# Patient Record
Sex: MALE | Race: OTHER | NOT HISPANIC OR LATINO | URBAN - METROPOLITAN AREA
[De-identification: names, ages, dates, MRNs, and addresses within clinical notes are randomized per-mention and may not be internally consistent; named-entity substitution may affect disease eponyms.]

---

## 2019-12-02 ENCOUNTER — INPATIENT (INPATIENT)
Facility: HOSPITAL | Age: 44
LOS: 1 days | Discharge: LEFT BEFORE DISCHARGE | DRG: 329 | End: 2019-12-04
Attending: SURGERY | Admitting: SURGERY
Payer: MEDICAID

## 2019-12-02 VITALS
HEART RATE: 136 BPM | WEIGHT: 229.94 LBS | DIASTOLIC BLOOD PRESSURE: 107 MMHG | TEMPERATURE: 98 F | SYSTOLIC BLOOD PRESSURE: 153 MMHG | OXYGEN SATURATION: 95 % | RESPIRATION RATE: 18 BRPM | HEIGHT: 67 IN

## 2019-12-02 DIAGNOSIS — K27.5 CHRONIC OR UNSPECIFIED PEPTIC ULCER, SITE UNSPECIFIED, WITH PERFORATION: ICD-10-CM

## 2019-12-02 LAB
ALBUMIN SERPL ELPH-MCNC: 3.6 G/DL — SIGNIFICANT CHANGE UP (ref 3.3–5)
ALBUMIN SERPL ELPH-MCNC: 4.7 G/DL — SIGNIFICANT CHANGE UP (ref 3.3–5)
ALP SERPL-CCNC: 66 U/L — SIGNIFICANT CHANGE UP (ref 40–120)
ALP SERPL-CCNC: 88 U/L — SIGNIFICANT CHANGE UP (ref 40–120)
ALT FLD-CCNC: 23 U/L — SIGNIFICANT CHANGE UP (ref 10–45)
ALT FLD-CCNC: 25 U/L — SIGNIFICANT CHANGE UP (ref 10–45)
ANION GAP SERPL CALC-SCNC: 11 MMOL/L — SIGNIFICANT CHANGE UP (ref 5–17)
ANION GAP SERPL CALC-SCNC: 18 MMOL/L — HIGH (ref 5–17)
APTT BLD: 26.9 SEC — LOW (ref 27.5–36.3)
AST SERPL-CCNC: 12 U/L — SIGNIFICANT CHANGE UP (ref 10–40)
AST SERPL-CCNC: 12 U/L — SIGNIFICANT CHANGE UP (ref 10–40)
BASOPHILS # BLD AUTO: 0.03 K/UL — SIGNIFICANT CHANGE UP (ref 0–0.2)
BASOPHILS NFR BLD AUTO: 0.1 % — SIGNIFICANT CHANGE UP (ref 0–2)
BILIRUB DIRECT SERPL-MCNC: 0.4 MG/DL — HIGH (ref 0–0.2)
BILIRUB INDIRECT FLD-MCNC: 0.5 MG/DL — SIGNIFICANT CHANGE UP (ref 0.2–1)
BILIRUB SERPL-MCNC: 0.7 MG/DL — SIGNIFICANT CHANGE UP (ref 0.2–1.2)
BILIRUB SERPL-MCNC: 0.9 MG/DL — SIGNIFICANT CHANGE UP (ref 0.2–1.2)
BLD GP AB SCN SERPL QL: NEGATIVE — SIGNIFICANT CHANGE UP
BUN SERPL-MCNC: 21 MG/DL — SIGNIFICANT CHANGE UP (ref 7–23)
BUN SERPL-MCNC: 22 MG/DL — SIGNIFICANT CHANGE UP (ref 7–23)
CALCIUM SERPL-MCNC: 7.9 MG/DL — LOW (ref 8.4–10.5)
CALCIUM SERPL-MCNC: 9.7 MG/DL — SIGNIFICANT CHANGE UP (ref 8.4–10.5)
CHLORIDE SERPL-SCNC: 104 MMOL/L — SIGNIFICANT CHANGE UP (ref 96–108)
CHLORIDE SERPL-SCNC: 99 MMOL/L — SIGNIFICANT CHANGE UP (ref 96–108)
CO2 SERPL-SCNC: 25 MMOL/L — SIGNIFICANT CHANGE UP (ref 22–31)
CO2 SERPL-SCNC: 26 MMOL/L — SIGNIFICANT CHANGE UP (ref 22–31)
CREAT SERPL-MCNC: 0.99 MG/DL — SIGNIFICANT CHANGE UP (ref 0.5–1.3)
CREAT SERPL-MCNC: 0.99 MG/DL — SIGNIFICANT CHANGE UP (ref 0.5–1.3)
EOSINOPHIL # BLD AUTO: 0.02 K/UL — SIGNIFICANT CHANGE UP (ref 0–0.5)
EOSINOPHIL NFR BLD AUTO: 0.1 % — SIGNIFICANT CHANGE UP (ref 0–6)
GAS PNL BLDV: SIGNIFICANT CHANGE UP
GLUCOSE SERPL-MCNC: 117 MG/DL — HIGH (ref 70–99)
GLUCOSE SERPL-MCNC: 119 MG/DL — HIGH (ref 70–99)
HCT VFR BLD CALC: 43.6 % — SIGNIFICANT CHANGE UP (ref 39–50)
HCT VFR BLD CALC: 49.9 % — SIGNIFICANT CHANGE UP (ref 39–50)
HGB BLD-MCNC: 14.3 G/DL — SIGNIFICANT CHANGE UP (ref 13–17)
HGB BLD-MCNC: 17 G/DL — SIGNIFICANT CHANGE UP (ref 13–17)
IMM GRANULOCYTES NFR BLD AUTO: 0.5 % — SIGNIFICANT CHANGE UP (ref 0–1.5)
INR BLD: 1.01 RATIO — SIGNIFICANT CHANGE UP (ref 0.88–1.16)
LIDOCAIN IGE QN: 20 U/L — SIGNIFICANT CHANGE UP (ref 7–60)
LYMPHOCYTES # BLD AUTO: 13.7 % — SIGNIFICANT CHANGE UP (ref 13–44)
LYMPHOCYTES # BLD AUTO: 2.76 K/UL — SIGNIFICANT CHANGE UP (ref 1–3.3)
MAGNESIUM SERPL-MCNC: 1.9 MG/DL — SIGNIFICANT CHANGE UP (ref 1.6–2.6)
MCHC RBC-ENTMCNC: 28.4 PG — SIGNIFICANT CHANGE UP (ref 27–34)
MCHC RBC-ENTMCNC: 28.8 PG — SIGNIFICANT CHANGE UP (ref 27–34)
MCHC RBC-ENTMCNC: 32.8 GM/DL — SIGNIFICANT CHANGE UP (ref 32–36)
MCHC RBC-ENTMCNC: 34.1 GM/DL — SIGNIFICANT CHANGE UP (ref 32–36)
MCV RBC AUTO: 84.4 FL — SIGNIFICANT CHANGE UP (ref 80–100)
MCV RBC AUTO: 86.7 FL — SIGNIFICANT CHANGE UP (ref 80–100)
MONOCYTES # BLD AUTO: 1.04 K/UL — HIGH (ref 0–0.9)
MONOCYTES NFR BLD AUTO: 5.2 % — SIGNIFICANT CHANGE UP (ref 2–14)
NEUTROPHILS # BLD AUTO: 16.16 K/UL — HIGH (ref 1.8–7.4)
NEUTROPHILS NFR BLD AUTO: 80.4 % — HIGH (ref 43–77)
NRBC # BLD: 0 /100 WBCS — SIGNIFICANT CHANGE UP (ref 0–0)
NRBC # BLD: 0 /100 WBCS — SIGNIFICANT CHANGE UP (ref 0–0)
PHOSPHATE SERPL-MCNC: 5 MG/DL — HIGH (ref 2.5–4.5)
PLATELET # BLD AUTO: 245 K/UL — SIGNIFICANT CHANGE UP (ref 150–400)
PLATELET # BLD AUTO: 300 K/UL — SIGNIFICANT CHANGE UP (ref 150–400)
POTASSIUM SERPL-MCNC: 3.4 MMOL/L — LOW (ref 3.5–5.3)
POTASSIUM SERPL-MCNC: 4.2 MMOL/L — SIGNIFICANT CHANGE UP (ref 3.5–5.3)
POTASSIUM SERPL-SCNC: 3.4 MMOL/L — LOW (ref 3.5–5.3)
POTASSIUM SERPL-SCNC: 4.2 MMOL/L — SIGNIFICANT CHANGE UP (ref 3.5–5.3)
PROT SERPL-MCNC: 6.3 G/DL — SIGNIFICANT CHANGE UP (ref 6–8.3)
PROT SERPL-MCNC: 8.1 G/DL — SIGNIFICANT CHANGE UP (ref 6–8.3)
PROTHROM AB SERPL-ACNC: 11.5 SEC — SIGNIFICANT CHANGE UP (ref 10–12.9)
RBC # BLD: 5.03 M/UL — SIGNIFICANT CHANGE UP (ref 4.2–5.8)
RBC # BLD: 5.91 M/UL — HIGH (ref 4.2–5.8)
RBC # FLD: 11.7 % — SIGNIFICANT CHANGE UP (ref 10.3–14.5)
RBC # FLD: 12.1 % — SIGNIFICANT CHANGE UP (ref 10.3–14.5)
RH IG SCN BLD-IMP: POSITIVE — SIGNIFICANT CHANGE UP
RH IG SCN BLD-IMP: POSITIVE — SIGNIFICANT CHANGE UP
SODIUM SERPL-SCNC: 141 MMOL/L — SIGNIFICANT CHANGE UP (ref 135–145)
SODIUM SERPL-SCNC: 142 MMOL/L — SIGNIFICANT CHANGE UP (ref 135–145)
WBC # BLD: 20.11 K/UL — HIGH (ref 3.8–10.5)
WBC # BLD: 20.37 K/UL — HIGH (ref 3.8–10.5)
WBC # FLD AUTO: 20.11 K/UL — HIGH (ref 3.8–10.5)
WBC # FLD AUTO: 20.37 K/UL — HIGH (ref 3.8–10.5)

## 2019-12-02 PROCEDURE — 49905 OMENTAL FLAP INTRA-ABDOM: CPT

## 2019-12-02 PROCEDURE — 99222 1ST HOSP IP/OBS MODERATE 55: CPT | Mod: 57

## 2019-12-02 PROCEDURE — 74174 CTA ABD&PLVS W/CONTRAST: CPT | Mod: 26

## 2019-12-02 PROCEDURE — 93010 ELECTROCARDIOGRAM REPORT: CPT

## 2019-12-02 PROCEDURE — 43840 GSTRRPHY SUTR DUOL/GSTR ULCR: CPT

## 2019-12-02 PROCEDURE — 71275 CT ANGIOGRAPHY CHEST: CPT | Mod: 26

## 2019-12-02 PROCEDURE — 99291 CRITICAL CARE FIRST HOUR: CPT

## 2019-12-02 RX ORDER — PIPERACILLIN AND TAZOBACTAM 4; .5 G/20ML; G/20ML
3.38 INJECTION, POWDER, LYOPHILIZED, FOR SOLUTION INTRAVENOUS EVERY 8 HOURS
Refills: 0 | Status: DISCONTINUED | OUTPATIENT
Start: 2019-12-02 | End: 2019-12-02

## 2019-12-02 RX ORDER — HYDROMORPHONE HYDROCHLORIDE 2 MG/ML
0.5 INJECTION INTRAMUSCULAR; INTRAVENOUS; SUBCUTANEOUS
Refills: 0 | Status: DISCONTINUED | OUTPATIENT
Start: 2019-12-02 | End: 2019-12-04

## 2019-12-02 RX ORDER — PANTOPRAZOLE SODIUM 20 MG/1
8 TABLET, DELAYED RELEASE ORAL
Qty: 80 | Refills: 0 | Status: DISCONTINUED | OUTPATIENT
Start: 2019-12-02 | End: 2019-12-02

## 2019-12-02 RX ORDER — PANTOPRAZOLE SODIUM 20 MG/1
40 TABLET, DELAYED RELEASE ORAL ONCE
Refills: 0 | Status: COMPLETED | OUTPATIENT
Start: 2019-12-02 | End: 2019-12-02

## 2019-12-02 RX ORDER — ACETAMINOPHEN 500 MG
1000 TABLET ORAL EVERY 6 HOURS
Refills: 0 | Status: COMPLETED | OUTPATIENT
Start: 2019-12-02 | End: 2019-12-03

## 2019-12-02 RX ORDER — HYDROMORPHONE HYDROCHLORIDE 2 MG/ML
1 INJECTION INTRAMUSCULAR; INTRAVENOUS; SUBCUTANEOUS ONCE
Refills: 0 | Status: DISCONTINUED | OUTPATIENT
Start: 2019-12-02 | End: 2019-12-02

## 2019-12-02 RX ORDER — MORPHINE SULFATE 50 MG/1
4 CAPSULE, EXTENDED RELEASE ORAL ONCE
Refills: 0 | Status: DISCONTINUED | OUTPATIENT
Start: 2019-12-02 | End: 2019-12-02

## 2019-12-02 RX ORDER — PIPERACILLIN AND TAZOBACTAM 4; .5 G/20ML; G/20ML
3.38 INJECTION, POWDER, LYOPHILIZED, FOR SOLUTION INTRAVENOUS ONCE
Refills: 0 | Status: COMPLETED | OUTPATIENT
Start: 2019-12-02 | End: 2019-12-02

## 2019-12-02 RX ORDER — FENTANYL CITRATE 50 UG/ML
50 INJECTION INTRAVENOUS ONCE
Refills: 0 | Status: DISCONTINUED | OUTPATIENT
Start: 2019-12-02 | End: 2019-12-02

## 2019-12-02 RX ORDER — SODIUM CHLORIDE 9 MG/ML
1000 INJECTION, SOLUTION INTRAVENOUS ONCE
Refills: 0 | Status: COMPLETED | OUTPATIENT
Start: 2019-12-02 | End: 2019-12-02

## 2019-12-02 RX ORDER — NALOXONE HYDROCHLORIDE 4 MG/.1ML
0.1 SPRAY NASAL
Refills: 0 | Status: DISCONTINUED | OUTPATIENT
Start: 2019-12-02 | End: 2019-12-04

## 2019-12-02 RX ORDER — HEPARIN SODIUM 5000 [USP'U]/ML
5000 INJECTION INTRAVENOUS; SUBCUTANEOUS EVERY 12 HOURS
Refills: 0 | Status: DISCONTINUED | OUTPATIENT
Start: 2019-12-02 | End: 2019-12-03

## 2019-12-02 RX ORDER — PANTOPRAZOLE SODIUM 20 MG/1
40 TABLET, DELAYED RELEASE ORAL ONCE
Refills: 0 | Status: DISCONTINUED | OUTPATIENT
Start: 2019-12-02 | End: 2019-12-02

## 2019-12-02 RX ORDER — SODIUM CHLORIDE 9 MG/ML
1000 INJECTION INTRAMUSCULAR; INTRAVENOUS; SUBCUTANEOUS ONCE
Refills: 0 | Status: COMPLETED | OUTPATIENT
Start: 2019-12-02 | End: 2019-12-02

## 2019-12-02 RX ORDER — SODIUM CHLORIDE 9 MG/ML
1000 INJECTION, SOLUTION INTRAVENOUS
Refills: 0 | Status: DISCONTINUED | OUTPATIENT
Start: 2019-12-02 | End: 2019-12-03

## 2019-12-02 RX ORDER — PANTOPRAZOLE SODIUM 20 MG/1
40 TABLET, DELAYED RELEASE ORAL EVERY 12 HOURS
Refills: 0 | Status: DISCONTINUED | OUTPATIENT
Start: 2019-12-02 | End: 2019-12-04

## 2019-12-02 RX ORDER — FENTANYL CITRATE 50 UG/ML
100 INJECTION INTRAVENOUS ONCE
Refills: 0 | Status: DISCONTINUED | OUTPATIENT
Start: 2019-12-02 | End: 2019-12-02

## 2019-12-02 RX ORDER — HYDROMORPHONE HYDROCHLORIDE 2 MG/ML
0.5 INJECTION INTRAMUSCULAR; INTRAVENOUS; SUBCUTANEOUS ONCE
Refills: 0 | Status: DISCONTINUED | OUTPATIENT
Start: 2019-12-02 | End: 2019-12-02

## 2019-12-02 RX ORDER — PIPERACILLIN AND TAZOBACTAM 4; .5 G/20ML; G/20ML
3.38 INJECTION, POWDER, LYOPHILIZED, FOR SOLUTION INTRAVENOUS EVERY 8 HOURS
Refills: 0 | Status: DISCONTINUED | OUTPATIENT
Start: 2019-12-02 | End: 2019-12-04

## 2019-12-02 RX ORDER — ONDANSETRON 8 MG/1
4 TABLET, FILM COATED ORAL EVERY 6 HOURS
Refills: 0 | Status: DISCONTINUED | OUTPATIENT
Start: 2019-12-02 | End: 2019-12-04

## 2019-12-02 RX ORDER — SODIUM CHLORIDE 9 MG/ML
1000 INJECTION, SOLUTION INTRAVENOUS
Refills: 0 | Status: DISCONTINUED | OUTPATIENT
Start: 2019-12-02 | End: 2019-12-02

## 2019-12-02 RX ORDER — HYDROMORPHONE HYDROCHLORIDE 2 MG/ML
30 INJECTION INTRAMUSCULAR; INTRAVENOUS; SUBCUTANEOUS
Refills: 0 | Status: DISCONTINUED | OUTPATIENT
Start: 2019-12-02 | End: 2019-12-04

## 2019-12-02 RX ADMIN — SODIUM CHLORIDE 125 MILLILITER(S): 9 INJECTION, SOLUTION INTRAVENOUS at 15:49

## 2019-12-02 RX ADMIN — PANTOPRAZOLE SODIUM 40 MILLIGRAM(S): 20 TABLET, DELAYED RELEASE ORAL at 12:02

## 2019-12-02 RX ADMIN — Medication 1000 MILLIGRAM(S): at 17:45

## 2019-12-02 RX ADMIN — PIPERACILLIN AND TAZOBACTAM 200 GRAM(S): 4; .5 INJECTION, POWDER, LYOPHILIZED, FOR SOLUTION INTRAVENOUS at 07:27

## 2019-12-02 RX ADMIN — HYDROMORPHONE HYDROCHLORIDE 1 MILLIGRAM(S): 2 INJECTION INTRAMUSCULAR; INTRAVENOUS; SUBCUTANEOUS at 07:30

## 2019-12-02 RX ADMIN — FENTANYL CITRATE 100 MICROGRAM(S): 50 INJECTION INTRAVENOUS at 05:10

## 2019-12-02 RX ADMIN — PANTOPRAZOLE SODIUM 40 MILLIGRAM(S): 20 TABLET, DELAYED RELEASE ORAL at 07:27

## 2019-12-02 RX ADMIN — SODIUM CHLORIDE 1000 MILLILITER(S): 9 INJECTION, SOLUTION INTRAVENOUS at 06:55

## 2019-12-02 RX ADMIN — Medication 400 MILLIGRAM(S): at 16:43

## 2019-12-02 RX ADMIN — HYDROMORPHONE HYDROCHLORIDE 1 MILLIGRAM(S): 2 INJECTION INTRAMUSCULAR; INTRAVENOUS; SUBCUTANEOUS at 06:58

## 2019-12-02 RX ADMIN — PIPERACILLIN AND TAZOBACTAM 25 GRAM(S): 4; .5 INJECTION, POWDER, LYOPHILIZED, FOR SOLUTION INTRAVENOUS at 14:37

## 2019-12-02 RX ADMIN — HYDROMORPHONE HYDROCHLORIDE 30 MILLILITER(S): 2 INJECTION INTRAMUSCULAR; INTRAVENOUS; SUBCUTANEOUS at 17:25

## 2019-12-02 RX ADMIN — SODIUM CHLORIDE 2000 MILLILITER(S): 9 INJECTION, SOLUTION INTRAVENOUS at 06:02

## 2019-12-02 RX ADMIN — FENTANYL CITRATE 50 MICROGRAM(S): 50 INJECTION INTRAVENOUS at 06:55

## 2019-12-02 RX ADMIN — HYDROMORPHONE HYDROCHLORIDE 30 MILLILITER(S): 2 INJECTION INTRAMUSCULAR; INTRAVENOUS; SUBCUTANEOUS at 14:00

## 2019-12-02 RX ADMIN — HEPARIN SODIUM 5000 UNIT(S): 5000 INJECTION INTRAVENOUS; SUBCUTANEOUS at 17:47

## 2019-12-02 RX ADMIN — PIPERACILLIN AND TAZOBACTAM 25 GRAM(S): 4; .5 INJECTION, POWDER, LYOPHILIZED, FOR SOLUTION INTRAVENOUS at 22:26

## 2019-12-02 RX ADMIN — HYDROMORPHONE HYDROCHLORIDE 30 MILLILITER(S): 2 INJECTION INTRAMUSCULAR; INTRAVENOUS; SUBCUTANEOUS at 19:13

## 2019-12-02 RX ADMIN — HYDROMORPHONE HYDROCHLORIDE 1 MILLIGRAM(S): 2 INJECTION INTRAMUSCULAR; INTRAVENOUS; SUBCUTANEOUS at 06:55

## 2019-12-02 RX ADMIN — Medication 400 MILLIGRAM(S): at 22:59

## 2019-12-02 RX ADMIN — FENTANYL CITRATE 50 MICROGRAM(S): 50 INJECTION INTRAVENOUS at 07:30

## 2019-12-02 RX ADMIN — Medication 1000 MILLIGRAM(S): at 23:29

## 2019-12-02 RX ADMIN — HYDROMORPHONE HYDROCHLORIDE 1 MILLIGRAM(S): 2 INJECTION INTRAMUSCULAR; INTRAVENOUS; SUBCUTANEOUS at 05:37

## 2019-12-02 RX ADMIN — PANTOPRAZOLE SODIUM 40 MILLIGRAM(S): 20 TABLET, DELAYED RELEASE ORAL at 17:47

## 2019-12-02 RX ADMIN — SODIUM CHLORIDE 1000 MILLILITER(S): 9 INJECTION INTRAMUSCULAR; INTRAVENOUS; SUBCUTANEOUS at 05:38

## 2019-12-02 RX ADMIN — MORPHINE SULFATE 4 MILLIGRAM(S): 50 CAPSULE, EXTENDED RELEASE ORAL at 04:57

## 2019-12-02 RX ADMIN — HYDROMORPHONE HYDROCHLORIDE 30 MILLILITER(S): 2 INJECTION INTRAMUSCULAR; INTRAVENOUS; SUBCUTANEOUS at 16:46

## 2019-12-02 NOTE — H&P ADULT - ATTENDING COMMENTS
Patient seen and examined preoperatively   Signs / symptoms / imaging consistent with acute peptic ulcer perforation  Risks / benefits / alternatives to immediate OR for exploration and repair discussed with patient.  All questions answered, consent signed

## 2019-12-02 NOTE — H&P ADULT - NSHPSOCIALHISTORY_GEN_ALL_CORE
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

## 2019-12-02 NOTE — BRIEF OPERATIVE NOTE - OPERATION/FINDINGS
Patient was positioned in the supine position and a midline laparotomy incision was made.    Pre-pyloric ulcer was identified and repaired with an omental patch     Fascia was closed and skin was partially closed with staples and packed with .5 inch packing strips

## 2019-12-02 NOTE — ED ADULT NURSE NOTE - NS ED NURSE REPORT GIVEN TO FT
Pt received bed assignment. Pt aware. Report given to RNApril. VSS. Pt stable for transport. Chart given to charge desk. Will cont to monitor. Pt received bed assignment. Pt aware. Report given to OR Arpil CEDILLO. VSS. Pt stable for transport. Chart given to charge desk. Will cont to monitor.

## 2019-12-02 NOTE — H&P ADULT - ASSESSMENT
This is a 43 y/o male with no significant pmhx or surgical history who presents to the ED with c/o severe, abrupt epigastric pain that radiates to the R flank and back with imaging revealing acute perforated duodenal ulcer with pneumoperitoneum.    -Admit to Surgery, Dr. Harvey  -NPO  -Aggressive IVF resuscitation, currently receiving 3rd liter bolus  -IVF @ 150cc/hr  -IV Protonix, bolus then drip  -IV Zosyn  -For OR for emergent exploratory laparotomy  -Pt seen and examined with Dr. Harvey

## 2019-12-02 NOTE — H&P ADULT - NSHPLABSRESULTS_GEN_ALL_CORE
Vital Signs Last 24 Hrs  T(C): 36.6 (02 Dec 2019 04:39), Max: 36.6 (02 Dec 2019 04:39)  T(F): 97.9 (02 Dec 2019 04:39), Max: 97.9 (02 Dec 2019 04:39)  HR: 131 (02 Dec 2019 06:01) (131 - 140)  BP: 116/69 (02 Dec 2019 06:01) (115/112 - 158/102)  BP(mean): --  RR: 22 (02 Dec 2019 06:01) (18 - 24)  SpO2: 93% (02 Dec 2019 06:01) (93% - 95%) Vital Signs Last 24 Hrs  T(C): 36.6 (02 Dec 2019 04:39), Max: 36.6 (02 Dec 2019 04:39)  T(F): 97.9 (02 Dec 2019 04:39), Max: 97.9 (02 Dec 2019 04:39)  HR: 131 (02 Dec 2019 06:01) (131 - 140)  BP: 116/69 (02 Dec 2019 06:01) (115/112 - 158/102)  BP(mean): --  RR: 22 (02 Dec 2019 06:01) (18 - 24)  SpO2: 93% (02 Dec 2019 06:01) (93% - 95%)    12-02    142  |  99  |  22  ----------------------------<  119<H>  3.4<L>   |  25  |  0.99    Ca    9.7      02 Dec 2019 04:59    TPro  8.1  /  Alb  4.7  /  TBili  0.7  /  DBili  x   /  AST  12  /  ALT  25  /  AlkPhos  88  12-02                          17.0   20.11 )-----------( 300      ( 02 Dec 2019 04:59 )             49.9     < from: CT Angio Chest w/ IV Cont (12.02.19 @ 05:16) >    FINDINGS:    CHEST:     LUNGS AND LARGE AIRWAYS: Patent central airways. Bibasilar linear   atelectasis.  PLEURA: No pleural effusion.  VESSELS: No intramural hematoma, aortic dissection, or penetrating aortic   ulcer.  HEART: Left ventricular hypertrophy. No pericardial effusion.  MEDIASTINUM AND LORNA: No lymphadenopathy.  CHEST WALL AND LOWER NECK: Within normal limits.    ABDOMEN AND PELVIS:    LIVER: Multiple low-attenuation lesions in the right hepatic lobe. One of   the lesions demonstrates centripetal nodular enhancement, likely a   hemangioma.  BILEDUCTS: Normal caliber.  GALLBLADDER: Within normal limits.  SPLEEN: Within normal limits.  PANCREAS: Within normal limits.  ADRENALS: Within normal limits.  KIDNEYS/URETERS: Within normal limits.    BLADDER: Within normal limits.  REPRODUCTIVE ORGANS: Prostate within normal limits.    BOWEL: There is free intraperitoneal air with air and fluid centered   around the duodenal bulb region, which appears thickened and irregular   suggesting perforated ulcer. Scattered colonic diverticulosis without   diverticulitis. No bowel obstruction. Appendix is normal.  PERITONEUM: Pneumoperitoneum. Small volume pelvic ascites.  VESSELS: Within normal limits.  RETROPERITONEUM/LYMPH NODES: No lymphadenopathy.    ABDOMINAL WALL: Within normal limits.  BONES: Spinal degenerative changes.    IMPRESSION:     Free intraperitoneal air and ascites likely from perforated duodenal   ulcer.    No aortic dissection.

## 2019-12-02 NOTE — PROVIDER CONTACT NOTE (OTHER) - RECOMMENDATIONS
Bladder scan revealed  400 cc urine residual. and encouraged pt to void.  As per MD no other intervention required now

## 2019-12-02 NOTE — ED PROVIDER NOTE - CARE PLAN
Principal Discharge DX:	Perforated peptic ulcer  Secondary Diagnosis:	Leukocytosis  Secondary Diagnosis:	Tachycardia

## 2019-12-02 NOTE — ED PROVIDER NOTE - ATTENDING CONTRIBUTION TO CARE
MD Dalal:  patient seen and evaluated personally.   I agree with the History & Physical,  Impression & Plan other than what was detailed in my note.  MD Dalal    05:28  Limited hx d/t severe pain. 47 y/o m no reported abd surgeries presents w/ sudden onset severe abd pain, diaphoretic, afebrile tachycardic to 140, mildly htn, pt has sig abd ttp diffusely, put in for dissection study given amount of pain. ct called and pt brought there immediately. study done, reviewed by our team. appears to have sig dilated abd, discussed w/ rads, agreed aorta looked wnl, they did see pneumoperitoneum. surgery page called. MD Dalal:  patient seen and evaluated personally.   I agree with the History & Physical,  Impression & Plan other than what was detailed in my note.  MD Dalal    05:28  Limited hx d/t severe pain. 49 y/o m no reported abd surgeries presents w/ sudden onset severe abd pain, diaphoretic, afebrile tachycardic to 140, mildly htn, pt has sig abd ttp diffusely, put in for dissection study given amount of pain. ct called and pt brought there immediately. study done, reviewed by our team. appears to have sig dilated abd, discussed w/ rads, agreed aorta looked wnl, they did see pneumoperitoneum. surgery page called.    05:38. resident discussed w/ surgery who will evaluate pt

## 2019-12-02 NOTE — ED PROVIDER NOTE - OBJECTIVE STATEMENT
44 year-old male active smoker presents to the Emergency Department for abdominal pain.  Patient reports sudden onset LUQ abdominal pain. 44 year-old male active smoker presents to the Emergency Department for abdominal pain.  Patient reports sudden onset LUQ abdominal pain that has been progressively worsening.  + nausea.  + SOB secondary to pain.  No fevers, chills, vomiting, chest pain, dysuria, BIS.  Last BM was 3 days ago.  No history of abdominal surgeries.  Tried to take [place] at home w/o relief.

## 2019-12-02 NOTE — ED ADULT NURSE REASSESSMENT NOTE - NS ED NURSE REASSESS COMMENT FT1
Report received from NGUYEN Montez. Pt A+Ox3, noted to have perforated peptic ulcer and to go to OR. Pt c/o 10/10 abdominal pain, tachycardic to 120's, pain medication administered by NGUYEN Montez, belongings secured. Report given to OR by NGUYEN Montez and OR updated on VS by NGUYEN Aguila. Pt transported to OR. Report received from NGUYEN Montez. Pt A+Ox3, noted to have perforated peptic ulcer and to go to OR. Pt c/o 10/10 abdominal pain, tachycardic to 120's, pain medication and LR administered by NGUYEN Montez, belongings secured. Report given to OR by NGUYEN Montez and OR updated on VS by NGUYEN Aguila. Protonix push and zosyn administered by Ayla CEDILLO. Pt transported to OR at 0740

## 2019-12-02 NOTE — H&P ADULT - HISTORY OF PRESENT ILLNESS
This is a 45 y/o male with no medical history who presents to the ED with c/o severe epigastric pain that began around 11pm last night. Pt states that the pain came about abruptly to which he attributed to bad indigestion and took serina-seltzer.  The pain subsided for approximately 5 mins and then vame back again.  He then tried eating a banana to help with the pain however he did not tolerate it.  Pt then presented to the Ed for further evaluation.  Pt states that the pain is intense in nature and not alleviated with either medications or positioning. He states that the pain radiates to his right flank and back.  He is nauseous but denies emesis.  He denies ever feeling a similar pain in the past.  CT Scan was obtained in the ED which revealed a perforated duodenal ulcer with pneumoperitoneum

## 2019-12-02 NOTE — BRIEF OPERATIVE NOTE - NSICDXBRIEFPROCEDURE_GEN_ALL_CORE_FT
PROCEDURES:  Omental flap 02-Dec-2019 11:15:24 Omental patch Barrington Livingston  Exploratory laparotomy 02-Dec-2019 11:14:12  Barrington Livingston

## 2019-12-02 NOTE — ED PROVIDER NOTE - CLINICAL SUMMARY MEDICAL DECISION MAKING FREE TEXT BOX
44 year-old male active smoker presents to the Emergency Department for abdominal pain; severe distress.  Given differential BP in both arms and pulses - plan for CTA to rule-out dissection.  DDx also includes obstruction, GI perforation, infectious etiology; unlikely ACS, PE.  Plan for labs, imaging, fluids, pain control.

## 2019-12-02 NOTE — H&P ADULT - NSHPPHYSICALEXAM_GEN_ALL_CORE
General: Uncomfortable appearing, Pleasant  Neurology: Patient is AA&Ox4, follows commands, and speech fluent. EOMI intact, PERRLA, 3mm--2mm. Sensation in the Trigeminal distribution is wnl and symmetrical. Facial muscles intact and symmetrical. Hearing appropriate. Uvula rises equally upon phonation and tongue protrudes symmetrically. SCM/Trapezius 5/5 power.  Neck: Neck supple, trachea midline, No JVD  Respiratory: CTA B/L, (-)rales, rhonchi  CV: S1S2, tachycardia, (-)m/r/g  Abdomen: Firmly distended; (+)tenderness throughout, absent bowel sounds  Extremities: 2+ peripheral pulses bilat throughout; (-)edema appreciated  Skin: No Rashes, Hematoma, Ecchymosis

## 2019-12-02 NOTE — ED ADULT NURSE NOTE - OBJECTIVE STATEMENT
45 YO male current smoker c/o severe, diffuse abdominal pain, 10/10, radiating to back. Patient reports that pain woke him up from sleep and came on suddenly. Patient took cab to ER where patient taken immediately to CT. Patient was unable to lay flat on CT table as pain was too severe. MD Ortiz made aware, assessed patient and prescribed narcotics, see EMR. Patient is A&OX3, abdomen soft, diffuse tenderness, distended. denies SOB, HA, N/V/D, fever/chills, urinary symptoms, hematuria, weakness, dizziness, numbness, tinging. Peripheral pulses present b/l. Skin warm, dry and pink. Pt placed in position of comfort. Pt educated on call bell system and provided call bell. Bed in lowest position, wheels locked, appropriate side rails raised. Pt denies needs at this time.

## 2019-12-02 NOTE — CHART NOTE - NSCHARTNOTEFT_GEN_A_CORE
POST-OPERATIVE NOTE    Subjective:  Patient is s/p exploratory laparotomy with omental patch repair of a perforated pre-pyloric peptic ulcer. Patient feels a bit sleepy and wasn't able to get a good night of sleep. Denies nausea, vomiting, chest pain, sob, fevers chills. Pain is well controlled.     Vital Signs Last 24 Hrs  T(C): 37.5 (02 Dec 2019 14:00), Max: 37.7 (02 Dec 2019 10:50)  T(F): 99.5 (02 Dec 2019 14:00), Max: 99.9 (02 Dec 2019 10:50)  HR: 99 (02 Dec 2019 15:00) (85 - 140)  BP: 114/67 (02 Dec 2019 15:00) (114/67 - 165/102)  BP(mean): 84 (02 Dec 2019 15:00) (84 - 129)  RR: 18 (02 Dec 2019 15:00) (16 - 24)  SpO2: 92% (02 Dec 2019 15:00) (92% - 98%)  I&O's Detail    02 Dec 2019 07:01  -  02 Dec 2019 16:09  --------------------------------------------------------  IN:    IV PiggyBack: 25 mL    lactated ringers.: 500 mL  Total IN: 525 mL    OUT:    Bulb: 60 mL    Nasoenteral Tube: 10 mL  Total OUT: 70 mL    Total NET: 455 mL          Physical Exam:  General: NAD, resting comfortably in bed  Pulmonary: Nonlabored breathing, no respiratory distress  Cardiovascular: NSR  Abdominal: soft, NT/ND, dressing c/d/i. ANA drain with SS fluid with slight bilious tinge. NGT to LCWS. midline dressing appropriately saturated and intact.  Extremities: WWP    LABS:                        14.3   20.37 )-----------( 245      ( 02 Dec 2019 12:04 )             43.6     12-02    141  |  104  |  21  ----------------------------<  117<H>  4.2   |  26  |  0.99    Ca    7.9<L>      02 Dec 2019 12:04  Phos  5.0     12-02  Mg     1.9     12-02    TPro  6.3  /  Alb  3.6  /  TBili  0.9  /  DBili  0.4<H>  /  AST  12  /  ALT  23  /  AlkPhos  66  12-02    PT/INR - ( 02 Dec 2019 04:59 )   PT: 11.5 sec;   INR: 1.01 ratio         PTT - ( 02 Dec 2019 04:59 )  PTT:26.9 sec      Assessment:  The patient is a 44y Male who is POD0 from an exploratory laparotomy with omental patch repair of a perforated pre-pyloric peptic ulcer. Patient requring 2L of NC in PACU. Recovering appropriately and is Ok to transfer to the floor.    Plan:  - Pain control with PCA and ATC acetaminophen  - DVT ppx with subQ Heparin  - Protonix BID  - C/w NGT to LCWS, please do not manipulate  - C/w NPO/LR@125/Zosyn  - OOB and ambulating as tolerated/ IS  - F/u AM labs    ATP,  [id number]

## 2019-12-02 NOTE — ED PROVIDER NOTE - PHYSICAL EXAMINATION
*Gen: + severe distress, AAO*3  *HEENT: NC/AT, MMM, airway patent, trachea midline  *CV: RRR, S1/S2 present, no murmurs/rubs  *Resp: no respiratory distress, LCTAB, no wheezing/rales  *Abd: distended, diffuse TTP with guarding  *Neuro: no focal neuro deficits, moving all limbs appropriately  *Extremities: no gross deformity; differential BP and pulses in the b/l UE  *Skin: no rashes, no wounds   ~ Harriet Ortiz M.D.

## 2019-12-03 LAB
-  COAGULASE NEGATIVE STAPHYLOCOCCUS: SIGNIFICANT CHANGE UP
ALBUMIN SERPL ELPH-MCNC: 3.4 G/DL — SIGNIFICANT CHANGE UP (ref 3.3–5)
ALP SERPL-CCNC: 62 U/L — SIGNIFICANT CHANGE UP (ref 40–120)
ALT FLD-CCNC: 17 U/L — SIGNIFICANT CHANGE UP (ref 10–45)
AMYLASE P1 CFR SERPL: 31 U/L — SIGNIFICANT CHANGE UP (ref 25–125)
ANION GAP SERPL CALC-SCNC: 14 MMOL/L — SIGNIFICANT CHANGE UP (ref 5–17)
ANION GAP SERPL CALC-SCNC: 14 MMOL/L — SIGNIFICANT CHANGE UP (ref 5–17)
APPEARANCE UR: ABNORMAL
AST SERPL-CCNC: 12 U/L — SIGNIFICANT CHANGE UP (ref 10–40)
BACTERIA # UR AUTO: ABNORMAL
BASOPHILS # BLD AUTO: 0.02 K/UL — SIGNIFICANT CHANGE UP (ref 0–0.2)
BASOPHILS NFR BLD AUTO: 0.1 % — SIGNIFICANT CHANGE UP (ref 0–2)
BILIRUB SERPL-MCNC: 0.8 MG/DL — SIGNIFICANT CHANGE UP (ref 0.2–1.2)
BILIRUB UR-MCNC: NEGATIVE — SIGNIFICANT CHANGE UP
BUN SERPL-MCNC: 18 MG/DL — SIGNIFICANT CHANGE UP (ref 7–23)
BUN SERPL-MCNC: 20 MG/DL — SIGNIFICANT CHANGE UP (ref 7–23)
CALCIUM SERPL-MCNC: 8.1 MG/DL — LOW (ref 8.4–10.5)
CALCIUM SERPL-MCNC: 8.6 MG/DL — SIGNIFICANT CHANGE UP (ref 8.4–10.5)
CHLORIDE SERPL-SCNC: 102 MMOL/L — SIGNIFICANT CHANGE UP (ref 96–108)
CHLORIDE SERPL-SCNC: 103 MMOL/L — SIGNIFICANT CHANGE UP (ref 96–108)
CO2 SERPL-SCNC: 24 MMOL/L — SIGNIFICANT CHANGE UP (ref 22–31)
CO2 SERPL-SCNC: 25 MMOL/L — SIGNIFICANT CHANGE UP (ref 22–31)
COLOR SPEC: YELLOW — SIGNIFICANT CHANGE UP
CREAT SERPL-MCNC: 0.85 MG/DL — SIGNIFICANT CHANGE UP (ref 0.5–1.3)
CREAT SERPL-MCNC: 0.89 MG/DL — SIGNIFICANT CHANGE UP (ref 0.5–1.3)
DIFF PNL FLD: ABNORMAL
EOSINOPHIL # BLD AUTO: 0.02 K/UL — SIGNIFICANT CHANGE UP (ref 0–0.5)
EOSINOPHIL NFR BLD AUTO: 0.1 % — SIGNIFICANT CHANGE UP (ref 0–6)
EPI CELLS # UR: 31 — SIGNIFICANT CHANGE UP
GLUCOSE SERPL-MCNC: 82 MG/DL — SIGNIFICANT CHANGE UP (ref 70–99)
GLUCOSE SERPL-MCNC: 90 MG/DL — SIGNIFICANT CHANGE UP (ref 70–99)
GLUCOSE UR QL: NEGATIVE — SIGNIFICANT CHANGE UP
GRAM STN FLD: SIGNIFICANT CHANGE UP
HCT VFR BLD CALC: 43.1 % — SIGNIFICANT CHANGE UP (ref 39–50)
HGB BLD-MCNC: 13.4 G/DL — SIGNIFICANT CHANGE UP (ref 13–17)
HYALINE CASTS # UR AUTO: 0 /LPF — SIGNIFICANT CHANGE UP (ref 0–7)
IMM GRANULOCYTES NFR BLD AUTO: 0.6 % — SIGNIFICANT CHANGE UP (ref 0–1.5)
KETONES UR-MCNC: ABNORMAL
LACTATE SERPL-SCNC: 0.8 MMOL/L — SIGNIFICANT CHANGE UP (ref 0.7–2)
LEUKOCYTE ESTERASE UR-ACNC: ABNORMAL
LYMPHOCYTES # BLD AUTO: 1.09 K/UL — SIGNIFICANT CHANGE UP (ref 1–3.3)
LYMPHOCYTES # BLD AUTO: 7.9 % — LOW (ref 13–44)
MAGNESIUM SERPL-MCNC: 2.1 MG/DL — SIGNIFICANT CHANGE UP (ref 1.6–2.6)
MCHC RBC-ENTMCNC: 28 PG — SIGNIFICANT CHANGE UP (ref 27–34)
MCHC RBC-ENTMCNC: 31.1 GM/DL — LOW (ref 32–36)
MCV RBC AUTO: 90.2 FL — SIGNIFICANT CHANGE UP (ref 80–100)
METHOD TYPE: SIGNIFICANT CHANGE UP
MONOCYTES # BLD AUTO: 0.83 K/UL — SIGNIFICANT CHANGE UP (ref 0–0.9)
MONOCYTES NFR BLD AUTO: 6 % — SIGNIFICANT CHANGE UP (ref 2–14)
NEUTROPHILS # BLD AUTO: 11.75 K/UL — HIGH (ref 1.8–7.4)
NEUTROPHILS NFR BLD AUTO: 85.3 % — HIGH (ref 43–77)
NITRITE UR-MCNC: NEGATIVE — SIGNIFICANT CHANGE UP
PH UR: 6 — SIGNIFICANT CHANGE UP (ref 5–8)
PHOSPHATE SERPL-MCNC: 2.5 MG/DL — SIGNIFICANT CHANGE UP (ref 2.5–4.5)
PLATELET # BLD AUTO: 202 K/UL — SIGNIFICANT CHANGE UP (ref 150–400)
POTASSIUM SERPL-MCNC: 4 MMOL/L — SIGNIFICANT CHANGE UP (ref 3.5–5.3)
POTASSIUM SERPL-MCNC: 4 MMOL/L — SIGNIFICANT CHANGE UP (ref 3.5–5.3)
POTASSIUM SERPL-SCNC: 4 MMOL/L — SIGNIFICANT CHANGE UP (ref 3.5–5.3)
POTASSIUM SERPL-SCNC: 4 MMOL/L — SIGNIFICANT CHANGE UP (ref 3.5–5.3)
PROT SERPL-MCNC: 6.1 G/DL — SIGNIFICANT CHANGE UP (ref 6–8.3)
PROT UR-MCNC: ABNORMAL
RBC # BLD: 4.78 M/UL — SIGNIFICANT CHANGE UP (ref 4.2–5.8)
RBC # FLD: 12.4 % — SIGNIFICANT CHANGE UP (ref 10.3–14.5)
RBC CASTS # UR COMP ASSIST: 7 /HPF — HIGH (ref 0–4)
SODIUM SERPL-SCNC: 141 MMOL/L — SIGNIFICANT CHANGE UP (ref 135–145)
SODIUM SERPL-SCNC: 141 MMOL/L — SIGNIFICANT CHANGE UP (ref 135–145)
SP GR SPEC: 1.03 — HIGH (ref 1.01–1.02)
SPECIMEN SOURCE: SIGNIFICANT CHANGE UP
UROBILINOGEN FLD QL: NEGATIVE — SIGNIFICANT CHANGE UP
WBC # BLD: 13.79 K/UL — HIGH (ref 3.8–10.5)
WBC # FLD AUTO: 13.79 K/UL — HIGH (ref 3.8–10.5)
WBC UR QL: 38 /HPF — HIGH (ref 0–5)

## 2019-12-03 PROCEDURE — 71045 X-RAY EXAM CHEST 1 VIEW: CPT | Mod: 26

## 2019-12-03 RX ORDER — DEXTROSE MONOHYDRATE, SODIUM CHLORIDE, AND POTASSIUM CHLORIDE 50; .745; 4.5 G/1000ML; G/1000ML; G/1000ML
1000 INJECTION, SOLUTION INTRAVENOUS
Refills: 0 | Status: DISCONTINUED | OUTPATIENT
Start: 2019-12-03 | End: 2019-12-04

## 2019-12-03 RX ORDER — HEPARIN SODIUM 5000 [USP'U]/ML
5000 INJECTION INTRAVENOUS; SUBCUTANEOUS EVERY 8 HOURS
Refills: 0 | Status: DISCONTINUED | OUTPATIENT
Start: 2019-12-03 | End: 2019-12-04

## 2019-12-03 RX ORDER — ACETAMINOPHEN 500 MG
1000 TABLET ORAL ONCE
Refills: 0 | Status: COMPLETED | OUTPATIENT
Start: 2019-12-03 | End: 2019-12-03

## 2019-12-03 RX ADMIN — HEPARIN SODIUM 5000 UNIT(S): 5000 INJECTION INTRAVENOUS; SUBCUTANEOUS at 05:17

## 2019-12-03 RX ADMIN — HEPARIN SODIUM 5000 UNIT(S): 5000 INJECTION INTRAVENOUS; SUBCUTANEOUS at 21:50

## 2019-12-03 RX ADMIN — Medication 1000 MILLIGRAM(S): at 09:30

## 2019-12-03 RX ADMIN — Medication 400 MILLIGRAM(S): at 09:04

## 2019-12-03 RX ADMIN — Medication 1000 MILLIGRAM(S): at 20:15

## 2019-12-03 RX ADMIN — PIPERACILLIN AND TAZOBACTAM 25 GRAM(S): 4; .5 INJECTION, POWDER, LYOPHILIZED, FOR SOLUTION INTRAVENOUS at 14:03

## 2019-12-03 RX ADMIN — HYDROMORPHONE HYDROCHLORIDE 30 MILLILITER(S): 2 INJECTION INTRAMUSCULAR; INTRAVENOUS; SUBCUTANEOUS at 14:01

## 2019-12-03 RX ADMIN — HYDROMORPHONE HYDROCHLORIDE 30 MILLILITER(S): 2 INJECTION INTRAMUSCULAR; INTRAVENOUS; SUBCUTANEOUS at 19:19

## 2019-12-03 RX ADMIN — PANTOPRAZOLE SODIUM 40 MILLIGRAM(S): 20 TABLET, DELAYED RELEASE ORAL at 05:17

## 2019-12-03 RX ADMIN — Medication 400 MILLIGRAM(S): at 05:18

## 2019-12-03 RX ADMIN — PIPERACILLIN AND TAZOBACTAM 25 GRAM(S): 4; .5 INJECTION, POWDER, LYOPHILIZED, FOR SOLUTION INTRAVENOUS at 21:50

## 2019-12-03 RX ADMIN — PIPERACILLIN AND TAZOBACTAM 25 GRAM(S): 4; .5 INJECTION, POWDER, LYOPHILIZED, FOR SOLUTION INTRAVENOUS at 05:17

## 2019-12-03 RX ADMIN — HYDROMORPHONE HYDROCHLORIDE 0.5 MILLIGRAM(S): 2 INJECTION INTRAMUSCULAR; INTRAVENOUS; SUBCUTANEOUS at 09:08

## 2019-12-03 RX ADMIN — Medication 1000 MILLIGRAM(S): at 05:48

## 2019-12-03 RX ADMIN — Medication 400 MILLIGRAM(S): at 19:43

## 2019-12-03 RX ADMIN — HYDROMORPHONE HYDROCHLORIDE 30 MILLILITER(S): 2 INJECTION INTRAMUSCULAR; INTRAVENOUS; SUBCUTANEOUS at 07:19

## 2019-12-03 RX ADMIN — PANTOPRAZOLE SODIUM 40 MILLIGRAM(S): 20 TABLET, DELAYED RELEASE ORAL at 19:23

## 2019-12-03 NOTE — PROVIDER CONTACT NOTE (CRITICAL VALUE NOTIFICATION) - SITUATION
Critical blood culture result from Blood cultures from 12/2 results. Growth in aerobic bottle- Gram+ cocci in clusters

## 2019-12-03 NOTE — PROGRESS NOTE ADULT - SUBJECTIVE AND OBJECTIVE BOX
Day 1 of Anesthesia Pain Management Service    SUBJECTIVE: I'm doing ok    Pain Scale Score:	[X] Refer to charted pain scores    THERAPY:    [ ] IV PCA Morphine		[ ] 5 mg/mL	[ ] 1 mg/mL  [X] IV PCA Hydromorphone	[ ] 5 mg/mL	[X] 1 mg/mL  [ ] IV PCA Fentanyl		[ ] 50 micrograms/mL    Demand dose: 0.2 mg     Lockout: 6 minutes   Continuous Rate: 0 mg/hr  4 Hour Limit: 4 mg    MEDICATIONS  (STANDING):  heparin  Injectable 5000 Unit(s) SubCutaneous every 12 hours  HYDROmorphone PCA (1 mG/mL) 30 milliLiter(s) PCA Continuous PCA Continuous  lactated ringers. 1000 milliLiter(s) (125 mL/Hr) IV Continuous <Continuous>  pantoprazole  Injectable 40 milliGRAM(s) IV Push every 12 hours  piperacillin/tazobactam IVPB.. 3.375 Gram(s) IV Intermittent every 8 hours    MEDICATIONS  (PRN):  HYDROmorphone PCA (1 mG/mL) Rescue Clinician Bolus 0.5 milliGRAM(s) IV Push every 15 minutes PRN for Pain Scale GREATER THAN 6  naloxone Injectable 0.1 milliGRAM(s) IV Push every 3 minutes PRN For ANY of the following changes in patient status:  A. RR LESS THAN 10 breaths per minute, B. Oxygen saturation LESS THAN 90%, C. Sedation score of 6  ondansetron Injectable 4 milliGRAM(s) IV Push every 6 hours PRN Nausea      OBJECTIVE:    Sedation Score:	[ X] Alert 	[ ] Drowsy 	[ ] Arousable	[ ] Asleep	[ ] Unresponsive    Side Effects:	[X ] None	[ ] Nausea	[ ] Vomiting	[ ] Pruritus  		[ ] Other:    Vital Signs Last 24 Hrs  T(C): 37.1 (03 Dec 2019 05:48), Max: 37.7 (02 Dec 2019 10:50)  T(F): 98.8 (03 Dec 2019 05:48), Max: 99.9 (02 Dec 2019 10:50)  HR: 94 (03 Dec 2019 05:48) (85 - 112)  BP: 105/60 (03 Dec 2019 05:48) (101/60 - 165/102)  BP(mean): 82 (02 Dec 2019 16:26) (82 - 129)  RR: 18 (03 Dec 2019 05:48) (16 - 18)  SpO2: 95% (03 Dec 2019 05:48) (92% - 99%)    ASSESSMENT/ PLAN    Therapy to  be:               [X] Continued   [ ] Discontinued   [ ] Changed to PRN Analgesics    Documentation and Verification of current medications:   [X] Done	[ ] Not done, not eligible    Comments: +NGT Using 1-4x/hr. Reeducated to use.

## 2019-12-03 NOTE — PROGRESS NOTE ADULT - SUBJECTIVE AND OBJECTIVE BOX
Surgery Progress Note     Subjective/24hour Events:   Patient seen and examined.   Patient being incorrigible and refusing to talk to anyone other than Dr. Harvey   Pain controlled.     Vital Signs:  Vital Signs Last 24 Hrs  T(C): 37.1 (03 Dec 2019 05:48), Max: 37.7 (02 Dec 2019 10:50)  T(F): 98.8 (03 Dec 2019 05:48), Max: 99.9 (02 Dec 2019 10:50)  HR: 94 (03 Dec 2019 05:48) (85 - 112)  BP: 105/60 (03 Dec 2019 05:48) (101/60 - 165/102)  BP(mean): 82 (02 Dec 2019 16:26) (82 - 129)  RR: 18 (03 Dec 2019 05:48) (16 - 18)  SpO2: 95% (03 Dec 2019 05:48) (92% - 99%)    CAPILLARY BLOOD GLUCOSE          I&O's Detail    02 Dec 2019 07:01  -  03 Dec 2019 07:00  --------------------------------------------------------  IN:    IV PiggyBack: 250 mL    lactated ringers.: 2375 mL  Total IN: 2625 mL    OUT:    Bulb: 65 mL    Nasoenteral Tube: 80 mL    Voided: 850 mL  Total OUT: 995 mL    Total NET: 1630 mL          MEDICATIONS  (STANDING):  heparin  Injectable 5000 Unit(s) SubCutaneous every 12 hours  HYDROmorphone PCA (1 mG/mL) 30 milliLiter(s) PCA Continuous PCA Continuous  lactated ringers. 1000 milliLiter(s) (125 mL/Hr) IV Continuous <Continuous>  pantoprazole  Injectable 40 milliGRAM(s) IV Push every 12 hours  piperacillin/tazobactam IVPB.. 3.375 Gram(s) IV Intermittent every 8 hours    MEDICATIONS  (PRN):  HYDROmorphone PCA (1 mG/mL) Rescue Clinician Bolus 0.5 milliGRAM(s) IV Push every 15 minutes PRN for Pain Scale GREATER THAN 6  naloxone Injectable 0.1 milliGRAM(s) IV Push every 3 minutes PRN For ANY of the following changes in patient status:  A. RR LESS THAN 10 breaths per minute, B. Oxygen saturation LESS THAN 90%, C. Sedation score of 6  ondansetron Injectable 4 milliGRAM(s) IV Push every 6 hours PRN Nausea      Physical Exam:  Gen: NAD.  Lungs: Non labored breathing.   Ab: Soft, tender to palpation, nondistended.   Ext: Moves all 4 spontaneously.     Labs:    12-03    141  |  102  |  20  ----------------------------<  82  4.0   |  25  |  0.89    Ca    8.1<L>      03 Dec 2019 07:01  Phos  2.5     12-03  Mg     2.1     12-03    TPro  6.1  /  Alb  3.4  /  TBili  0.8  /  DBili  x   /  AST  12  /  ALT  17  /  AlkPhos  62  12-03    LIVER FUNCTIONS - ( 03 Dec 2019 07:01 )  Alb: 3.4 g/dL / Pro: 6.1 g/dL / ALK PHOS: 62 U/L / ALT: 17 U/L / AST: 12 U/L / GGT: x                                 13.4   13.79 )-----------( 202      ( 03 Dec 2019 09:12 )             43.1     PT/INR - ( 02 Dec 2019 04:59 )   PT: 11.5 sec;   INR: 1.01 ratio         PTT - ( 02 Dec 2019 04:59 )  PTT:26.9 sec    Imaging:

## 2019-12-03 NOTE — PROGRESS NOTE ADULT - ATTENDING COMMENTS
Patient seen and examined on 12/3/19 6pm with RN  Was very upset, asking about results of surgery and plan of care  Non-toxic appearing  BP stable, + fever in afternoon, low grade tachycardia  ANA with minimal serosanguinous drainage    - Discussed findings in OR of perforated peptic ulcer and omental patch  - Discussed need for 3-5 hospitalization for antibiotics, NGT,  and observation for GI function  - Discussed need for GI consult before discharge, testing and possible treatment for H. Pylori, and eventual EGD as outpatient  - All questions answered

## 2019-12-04 VITALS
DIASTOLIC BLOOD PRESSURE: 86 MMHG | RESPIRATION RATE: 18 BRPM | SYSTOLIC BLOOD PRESSURE: 136 MMHG | HEART RATE: 107 BPM | TEMPERATURE: 98 F | OXYGEN SATURATION: 93 %

## 2019-12-04 LAB
CULTURE RESULTS: SIGNIFICANT CHANGE UP
ORGANISM # SPEC MICROSCOPIC CNT: SIGNIFICANT CHANGE UP
ORGANISM # SPEC MICROSCOPIC CNT: SIGNIFICANT CHANGE UP
SPECIMEN SOURCE: SIGNIFICANT CHANGE UP

## 2019-12-04 PROCEDURE — 84100 ASSAY OF PHOSPHORUS: CPT

## 2019-12-04 PROCEDURE — 74174 CTA ABD&PLVS W/CONTRAST: CPT

## 2019-12-04 PROCEDURE — 84295 ASSAY OF SERUM SODIUM: CPT

## 2019-12-04 PROCEDURE — 83605 ASSAY OF LACTIC ACID: CPT

## 2019-12-04 PROCEDURE — 82330 ASSAY OF CALCIUM: CPT

## 2019-12-04 PROCEDURE — 86901 BLOOD TYPING SEROLOGIC RH(D): CPT

## 2019-12-04 PROCEDURE — 96375 TX/PRO/DX INJ NEW DRUG ADDON: CPT | Mod: XU

## 2019-12-04 PROCEDURE — 93005 ELECTROCARDIOGRAM TRACING: CPT

## 2019-12-04 PROCEDURE — 96374 THER/PROPH/DIAG INJ IV PUSH: CPT | Mod: XU

## 2019-12-04 PROCEDURE — 86900 BLOOD TYPING SEROLOGIC ABO: CPT

## 2019-12-04 PROCEDURE — 85730 THROMBOPLASTIN TIME PARTIAL: CPT

## 2019-12-04 PROCEDURE — 82435 ASSAY OF BLOOD CHLORIDE: CPT

## 2019-12-04 PROCEDURE — 81001 URINALYSIS AUTO W/SCOPE: CPT

## 2019-12-04 PROCEDURE — 82150 ASSAY OF AMYLASE: CPT

## 2019-12-04 PROCEDURE — 86923 COMPATIBILITY TEST ELECTRIC: CPT

## 2019-12-04 PROCEDURE — 82803 BLOOD GASES ANY COMBINATION: CPT

## 2019-12-04 PROCEDURE — 85610 PROTHROMBIN TIME: CPT

## 2019-12-04 PROCEDURE — 87150 DNA/RNA AMPLIFIED PROBE: CPT

## 2019-12-04 PROCEDURE — 85027 COMPLETE CBC AUTOMATED: CPT

## 2019-12-04 PROCEDURE — 83735 ASSAY OF MAGNESIUM: CPT

## 2019-12-04 PROCEDURE — 71275 CT ANGIOGRAPHY CHEST: CPT

## 2019-12-04 PROCEDURE — P9016: CPT

## 2019-12-04 PROCEDURE — 86850 RBC ANTIBODY SCREEN: CPT

## 2019-12-04 PROCEDURE — 83690 ASSAY OF LIPASE: CPT

## 2019-12-04 PROCEDURE — 80076 HEPATIC FUNCTION PANEL: CPT

## 2019-12-04 PROCEDURE — 80048 BASIC METABOLIC PNL TOTAL CA: CPT

## 2019-12-04 PROCEDURE — 87040 BLOOD CULTURE FOR BACTERIA: CPT

## 2019-12-04 PROCEDURE — 85014 HEMATOCRIT: CPT

## 2019-12-04 PROCEDURE — 99291 CRITICAL CARE FIRST HOUR: CPT | Mod: 25

## 2019-12-04 PROCEDURE — 84132 ASSAY OF SERUM POTASSIUM: CPT

## 2019-12-04 PROCEDURE — 80053 COMPREHEN METABOLIC PANEL: CPT

## 2019-12-04 PROCEDURE — 84484 ASSAY OF TROPONIN QUANT: CPT

## 2019-12-04 PROCEDURE — 71045 X-RAY EXAM CHEST 1 VIEW: CPT

## 2019-12-04 PROCEDURE — 82947 ASSAY GLUCOSE BLOOD QUANT: CPT

## 2019-12-04 RX ORDER — HALOPERIDOL DECANOATE 100 MG/ML
5 INJECTION INTRAMUSCULAR ONCE
Refills: 0 | Status: COMPLETED | OUTPATIENT
Start: 2019-12-04 | End: 2019-12-04

## 2019-12-04 RX ORDER — ACETAMINOPHEN 500 MG
1000 TABLET ORAL ONCE
Refills: 0 | Status: COMPLETED | OUTPATIENT
Start: 2019-12-04 | End: 2019-12-04

## 2019-12-04 RX ORDER — PANTOPRAZOLE SODIUM 20 MG/1
40 TABLET, DELAYED RELEASE ORAL
Qty: 0 | Refills: 0 | DISCHARGE
Start: 2019-12-04

## 2019-12-04 RX ADMIN — HEPARIN SODIUM 5000 UNIT(S): 5000 INJECTION INTRAVENOUS; SUBCUTANEOUS at 05:22

## 2019-12-04 RX ADMIN — PANTOPRAZOLE SODIUM 40 MILLIGRAM(S): 20 TABLET, DELAYED RELEASE ORAL at 05:22

## 2019-12-04 RX ADMIN — HALOPERIDOL DECANOATE 5 MILLIGRAM(S): 100 INJECTION INTRAMUSCULAR at 02:35

## 2019-12-04 RX ADMIN — Medication 1000 MILLIGRAM(S): at 01:15

## 2019-12-04 RX ADMIN — Medication 400 MILLIGRAM(S): at 00:43

## 2019-12-04 RX ADMIN — PIPERACILLIN AND TAZOBACTAM 25 GRAM(S): 4; .5 INJECTION, POWDER, LYOPHILIZED, FOR SOLUTION INTRAVENOUS at 05:23

## 2019-12-04 RX ADMIN — DEXTROSE MONOHYDRATE, SODIUM CHLORIDE, AND POTASSIUM CHLORIDE 125 MILLILITER(S): 50; .745; 4.5 INJECTION, SOLUTION INTRAVENOUS at 05:20

## 2019-12-04 NOTE — CHART NOTE - NSCHARTNOTEFT_GEN_A_CORE
pt became agitated overnight, self discontinued NGT. attempted to leave the hospital.   refused to have ECG  previous ECG on 12/2/19 with sinus tachy, .     - haldol 5 mg x 1 dose.  - pain control    ACS  [id number]

## 2019-12-04 NOTE — DISCHARGE NOTE PROVIDER - NSDCMRMEDTOKEN_GEN_ALL_CORE_FT
no home medications: amoxicillin-clavulanate 875 mg-125 mg oral tablet: 1 tab(s) orally 2 times a day MDD:2 tabs  pantoprazole 40 mg intravenous injection: 40 milligram(s) intravenous every 12 hours

## 2019-12-04 NOTE — DISCHARGE NOTE PROVIDER - HOSPITAL COURSE
12/2/19: Patient presented to the ED with severe epigastric pain that began around 11pm prior  night. Pain is intense in nature and not alleviated with either medications or positioning, and radiates to right flank and back. He is nauseous but denies emesis.  He denies ever feeling a similar pain in the past.  CT Scan was obtained in the ED which revealed a perforated duodenal ulcer with pneumoperitoneum. Patient was taken to the OR emergently that day. A pre-pyloric ulcer was identified and repaired with an omental patch. Patient recovered on 12/2 without further issue and received IV Zosyn.        12/3/19: Patient was agitated during morning rounds, demanding to speak with only Dr. Harvey. He would refuse to communicate with anyone other than Dr. Harvey. Patient was also refusing transfer to chair and other nursing related orders. Patient was noted to have become febrile and a chest x-ray, CBC, UA, and blood culture was ordered. HIs white cell count was elevated to 13.7 but was down from 20.4 the day prior. His urinalysis revealed few bacteria, large leukocyte esterase, 38 wbc per high field, and moderate blood.         12/4/19: During early morning on 12/4, patient got out of bed, put on his shoes, and began pacing around the hospital, and demanding to leave, and became aggressive with staff. Security was called. At this point, the overnight PA informed us that patient had received IV Haldol and calmed down. Patient was tachycardic at one point and stated that he was hallucinating. At around 5:50AM, patient tried to climb out of the window and insisted upon leaving against medical advice. Security was called again. Patent believed he had been admitted for 4 days. Patient was asked about alcohol use and stated that he last used alcohol 2 months ago. Patient was thoroughly counseled upon risks of leaving hospital against medical advice, including worsening of status, infection, blood loss, and even death. Patient also refused oral antibiotics. 12/2/19: Patient presented to the ED with severe epigastric pain that began around 11pm prior  night. Pain is intense in nature and not alleviated with either medications or positioning, and radiates to right flank and back. He is nauseous but denies emesis.  He denies ever feeling a similar pain in the past.  CT Scan was obtained in the ED which revealed a perforated duodenal ulcer with pneumoperitoneum. Patient was taken to the OR emergently that day. A pre-pyloric ulcer was identified and repaired with an omental patch. Patient recovered on 12/2 without further issue and received IV Zosyn.        12/3/19: Patient was agitated during morning rounds, demanding to speak with only Dr. Harvey. He would refuse to communicate with anyone other than Dr. Harvey. Patient was also refusing transfer to chair and other nursing related orders. Patient was noted to have become febrile and a chest x-ray, CBC, UA, and blood culture was ordered. HIs white cell count was elevated to 13.7 but was down from 20.4 the day prior. His urinalysis revealed few bacteria, large leukocyte esterase, 38 wbc per high field, and moderate blood.         12/4/19: During early morning on 12/4, patient got out of bed, put on his shoes, and began pacing around the hospital, and demanding to leave, and became aggressive with staff. Security was called. At some point, patient also self-removed his NG tube. At this point, the overnight PA informed us that patient had received IV Haldol and went back to sleep. Patient was tachycardic at one point and stated that he was hallucinating. At around 5:50AM, patient tried to climb out of the window and insisted upon leaving against medical advice. Security was called again. Patent believed he had been admitted for 4 days. Patient was asked about alcohol use and stated that he last used alcohol 2 months ago. Patient reconfirmed he did not drink alcohol for over 2 months. Patient was thoroughly counseled upon risks of leaving hospital against medical advice, including worsening of status, infection, blood loss, not properly advancing his diet, and even death. Patient was also notified that because he is on IV antibiotics, he will need to continue with oral antibiotics. He was told he will need PO augmentin, however patient refused the prescription. Patient refused to tell us his home pharmacy, I informed patient that the prescription will still be sent to [facility]. I was informed by nursing staff that a formal discharge needs to be completed and document will be mailed to patient.

## 2019-12-04 NOTE — DISCHARGE NOTE PROVIDER - NSDCCPTREATMENT_GEN_ALL_CORE_FT
PRINCIPAL PROCEDURE  Procedure: Omental flap  Findings and Treatment:       SECONDARY PROCEDURE  Procedure: Exploratory laparotomy  Findings and Treatment:

## 2019-12-04 NOTE — PROVIDER CONTACT NOTE (OTHER) - ACTION/TREATMENT ORDERED:
Bloodwork, Blood cultures and U/A ordered.
PA aware. pt educated on effects of PCA dilauded and told to hold back on pressing pain pump for now. all VSS, will continue to monitor
will continue to monitor

## 2019-12-04 NOTE — PROVIDER CONTACT NOTE (OTHER) - RECOMMENDATIONS
PA notified, no interventions at this time with the NGT, but PA suggests holding the PCA Dilauded for now due to hallucinations

## 2019-12-04 NOTE — DISCHARGE NOTE PROVIDER - CARE PROVIDER_API CALL
Gerry Harvey)  Surgery; Surgical Critical Care  [address] [place] [address]  Phone: [phone]  Fax: [phone]  Follow Up Time:

## 2019-12-04 NOTE — PROVIDER CONTACT NOTE (OTHER) - DATE AND TIME:
Form completed and sent to Physician's Office electronically via [employer] for review and signature. Completed form will be faxed to [facility] at [phone] attn: Jade Oseguera 04-Dec-2019 01:00

## 2019-12-04 NOTE — DISCHARGE NOTE PROVIDER - NSDCFUADDINST_GEN_ALL_CORE_FT
WOUND CARE:   BATHING: Please do not submerge wound underwater. You may shower and/or sponge bathe.  ACTIVITY: No heavy lifting or straining. Otherwise, you may return to your usual level of physical activity. If you are taking narcotic pain medication (such as Percocet), do NOT drive a car, operate machinery or make important decisions.  DIET: Patient left against medical advice while he was still NPO. Therefore, patient lacks appropriate medical evaluation for advancement of diet. Slowly advance diet to clear liquids, then full liquids, and then return to your usual diet as tolerated.  NOTIFY YOUR SURGEON IF: You have any bleeding that does not stop, any pus draining from your wound, any fever (over 100.4 F) or chills, persistent nausea/vomiting, persistent diarrhea, or if your pain is not controlled on your discharge pain medications.  FOLLOW-UP:  1. Follow-up with Dr. Harvey within 1-2 weeks of discharge.  Please call office for appointment  2. Please follow up with your primary care physician in one week regarding your hospitalization.

## 2019-12-04 NOTE — PROVIDER CONTACT NOTE (OTHER) - ASSESSMENT
Pt refused to transfer pt oob to chair at this time and mary. throughout shift, MD/PA aware
Pt DTV at 7 pm as per PACU RN. As per MD bladder scanned  the pt
pt seems disoriented and c/o having hallucinations at this time

## 2019-12-04 NOTE — DISCHARGE NOTE PROVIDER - NSDCCPCAREPLAN_GEN_ALL_CORE_FT
PRINCIPAL DISCHARGE DIAGNOSIS  Diagnosis: Perforated peptic ulcer  Assessment and Plan of Treatment:       SECONDARY DISCHARGE DIAGNOSES  Diagnosis: Tachycardia  Assessment and Plan of Treatment:     Diagnosis: Leukocytosis  Assessment and Plan of Treatment:

## 2019-12-04 NOTE — DISCHARGE NOTE NURSING/CASE MANAGEMENT/SOCIAL WORK - PATIENT PORTAL LINK FT
You can access the FollowMyHealth Patient Portal offered by [facility] by registering at the following website: [email or web address]. By joining [employer]’s FollowMyHealth portal, you will also be able to view your health information using other applications (apps) compatible with our system.

## 2019-12-04 NOTE — CHART NOTE - NSCHARTNOTEFT_GEN_A_CORE
12/2/19: Patient presented to the ED with severe epigastric pain that began around 11pm prior  night. Pain is intense in nature and not alleviated with either medications or positioning, and radiates to right flank and back. He is nauseous but denies emesis.  He denies ever feeling a similar pain in the past.  CT Scan was obtained in the ED which revealed a perforated duodenal ulcer with pneumoperitoneum. Patient was taken to the OR emergently that day. A pre-pyloric ulcer was identified and repaired with an omental patch. Patient recovered on 12/2 without further issue and received IV Zosyn.    12/3/19: Patient was agitated during morning rounds, demanding to speak with only Dr. Harvey. He would refuse to communicate with anyone other than Dr. Harvey. Patient was also refusing transfer to chair and other nursing related orders. Patient was noted to have become febrile and a chest x-ray, CBC, UA, and blood culture was ordered. HIs white cell count was elevated to 13.7 but was down from 20.4 the day prior. His urinalysis revealed few bacteria, large leukocyte esterase, 38 wbc per high field, and moderate blood.     12/4/19: During early morning on 12/4, patient got out of bed, put on his shoes, and began pacing around the hospital, and demanding to leave, and became aggressive with staff. Security was called. At some point, patient also self-removed his NG tube. At this point, the overnight PA informed us that patient had received IV Haldol and went back to sleep. Patient was tachycardic at one point and stated that he was hallucinating. At around 5:50AM, patient tried to climb out of the window and insisted upon leaving against medical advice. Security was called again. Patent believed he had been admitted for 4 days. Patient was asked about alcohol use and stated that he last used alcohol 2 months ago. Patient reconfirmed he did not drink alcohol for over 2 months and was counseled on the lethality and consequences of severe alcohol withdrawal. Patient refused workup for alcohol withdrawal. Patient was thoroughly counseled upon risks of leaving hospital against medical advice, including worsening of status, infection, blood loss, not properly advancing his diet, and even death. Patient was also notified that because he is on IV antibiotics, he will need to continue with oral antibiotics. He was told he will need PO augmentin, however patient refused the prescription. Patient refused to tell us his home pharmacy, I informed patient that the prescription will still be sent to [facility] at [facility]. I was informed by nursing staff that a formal discharge needs to be completed and document will be mailed to patient, which was completed at 6:50AM on 12/4.

## 2019-12-04 NOTE — DISCHARGE NOTE NURSING/CASE MANAGEMENT/SOCIAL WORK - NSDCPEWEB_GEN_ALL_CORE
[place] website --- [email or web address] Tobacco [email or web address] website --- [email or web address]

## 2019-12-04 NOTE — DISCHARGE NOTE NURSING/CASE MANAGEMENT/SOCIAL WORK - NSDCPEEMAIL_GEN_ALL_CORE
[facility] for Tobacco Control email [email or web address]

## 2019-12-07 LAB
CULTURE RESULTS: SIGNIFICANT CHANGE UP
SPECIMEN SOURCE: SIGNIFICANT CHANGE UP

## 2019-12-08 LAB
CULTURE RESULTS: SIGNIFICANT CHANGE UP
SPECIMEN SOURCE: SIGNIFICANT CHANGE UP